# Patient Record
Sex: MALE | Race: WHITE | Employment: FULL TIME | URBAN - METROPOLITAN AREA
[De-identification: names, ages, dates, MRNs, and addresses within clinical notes are randomized per-mention and may not be internally consistent; named-entity substitution may affect disease eponyms.]

---

## 2018-05-21 ENCOUNTER — OFFICE VISIT (OUTPATIENT)
Dept: GASTROENTEROLOGY | Facility: MEDICAL CENTER | Age: 43
End: 2018-05-21
Payer: COMMERCIAL

## 2018-05-21 VITALS
SYSTOLIC BLOOD PRESSURE: 118 MMHG | HEART RATE: 66 BPM | WEIGHT: 182 LBS | BODY MASS INDEX: 27.67 KG/M2 | TEMPERATURE: 97.5 F | DIASTOLIC BLOOD PRESSURE: 82 MMHG

## 2018-05-21 DIAGNOSIS — R10.13 EPIGASTRIC ABDOMINAL PAIN: Primary | ICD-10-CM

## 2018-05-21 DIAGNOSIS — R11.0 NAUSEA: ICD-10-CM

## 2018-05-21 PROCEDURE — 99243 OFF/OP CNSLTJ NEW/EST LOW 30: CPT | Performed by: INTERNAL MEDICINE

## 2018-05-21 RX ORDER — SUCRALFATE 1 G/10ML
SUSPENSION ORAL
COMMUNITY
Start: 2018-04-20

## 2018-05-21 RX ORDER — ONDANSETRON 4 MG/1
TABLET, ORALLY DISINTEGRATING ORAL
COMMUNITY
Start: 2018-04-25

## 2018-05-21 NOTE — PATIENT INSTRUCTIONS
EGD scheduled on 6/5/2018 with Dr Danny Lepe at [facility]  Instructions giving to patient

## 2018-05-21 NOTE — PROGRESS NOTES
[facility] - Outpatient Consultation  Nina Traore 44 y o  male MRN: [id number]  Encounter: [id number]          ASSESSMENT AND PLAN:      1  Epigastric abdominal pain  - labs were done in April, at that time his acute hepatitis panel was negative, liver enzymes, CBC, amylase and lipase were all within normal limits  - recent CT scan without contrast was unremarkable  - his symptoms were likely due to an infection or a stomach ulcer  I will schedule an EGD to rule out esophagitis, gastritis, and PUD  At the time of the procedure, I will take biopsies of any abnormalities I find  I discussed with him the risks and benefits of the procedure  - I will also order an ultrasound of the abdomen, and labs to check lipase and CMP  2  Nausea   - continue taking Carafate daily and ondansetron as needed   - will look for possible causes at the time of EGD  He will follow up based on the results of the studies  If symptoms do not improve, we will order a CT scan with contrast to investigate further etiology  ______________________________________________________________________    HPI:      Nina Traore is a 44 y o  male here for EGD evaluation  He presents with epigastric pain with associated nausea, vomiting, and fatigue  Symptoms started 3 weeks ago, he never had any similar symptoms before  The pain and nausea occur after every meals  He was prescribed Carafate and ondansetron, and the medications have resolved his symptoms  He denies NSAID use  He takes Tylenol for the pain  Symptoms are not affected by positional change  He denies change in stool caliber, melena, hematochezia, rectal bleeding, tenesmus, change in appetite, dysphagia, odynophagia and unintentional weight loss  He denies alcohol and tobacco abuse  REVIEW OF SYSTEMS:    CONSTITUTIONAL: Denies any fever, chills, rigors, and weight loss  HEENT: No earache or tinnitus   Denies hearing loss or visual disturbances  CARDIOVASCULAR: No chest pain or palpitations  RESPIRATORY: Denies any cough, hemoptysis, shortness of breath or dyspnea on exertion  GASTROINTESTINAL: As noted in the History of Present Illness  GENITOURINARY: No problems with urination  Denies any hematuria or dysuria  NEUROLOGIC: No dizziness or vertigo, denies headaches  MUSCULOSKELETAL: Denies any muscle or joint pain  SKIN: Denies skin rashes or itching  ENDOCRINE: Denies excessive thirst  Denies intolerance to heat or cold  PSYCHOSOCIAL: Denies depression or anxiety  Denies any recent memory loss  Historical Information   No past medical history on file  No past surgical history on file  Social History   History   Alcohol use Not on file     History   Drug use: Unknown     History   Smoking Status    Not on file   Smokeless Tobacco    Not on file     No family history on file  Meds/Allergies       Current Outpatient Prescriptions:     acetaminophen (TYLENOL) 100 mg/mL solution    CARAFATE 1 GM/10ML suspension    ondansetron (ZOFRAN-ODT) 4 mg disintegrating tablet    No Known Allergies        Objective     Blood pressure 118/82, pulse 66, temperature 97 5 °F (36 4 °C), temperature source Tympanic, weight 82 6 kg (182 lb)  Body mass index is 27 67 kg/m²  PHYSICAL EXAM:      General Appearance:   Alert, cooperative, no distress   HEENT:   Normocephalic, atraumatic, anicteric      Neck:  Supple, symmetrical, trachea midline   Lungs:   Clear to auscultation bilaterally; no rales, rhonchi or wheezing; respirations unlabored    Heart[de-identified]   Regular rate and rhythm; no murmur, rub, or gallop     Abdomen:   Soft, non-tender, non-distended; normal bowel sounds; no masses, no organomegaly    Genitalia:   Deferred    Rectal:   Deferred    Extremities:  No cyanosis, clubbing or edema    Pulses:  2+ and symmetric    Skin:  No jaundice, rashes, or lesions    Lymph nodes:  No palpable cervical lymphadenopathy        Lab Results: No visits with results within 1 Day(s) from this visit     Latest known visit with results is:   Conversion Encounter Outpatient on 04/24/2015   Component Date Value    Microbiology 04/24/2015 Urine Clean Cat     WBC 04/25/2015 9 88     RBC 04/25/2015 4 68     Hemoglobin 04/25/2015 14 2     Hematocrit 04/25/2015 41 5     MCV 04/25/2015 89     MCH 04/25/2015 30 3     MCHC 04/25/2015 34 2     Platelets 89/15/9227 227     Neutrophils Relative 04/25/2015 70     Neutrophils Absolute 04/25/2015 6 92     RDW 04/25/2015 12 5     MPV 04/25/2015 10 5     nRBC 04/25/2015 0     Lymphocytes Relative 04/25/2015 20     Monocytes Relative 04/25/2015 7     Eosinophils Relative 04/25/2015 3     Lymphocytes Absolute 04/25/2015 1 98     Monocytes Absolute 04/25/2015 0 69     Eosinophils Absolute 04/25/2015 0 30     Basophils Absolute 04/25/2015 0 01     Sodium 04/25/2015 140     Potassium 04/25/2015 3 9     Chloride 04/25/2015 108     CO2 04/25/2015 26     Anion Gap 04/25/2015 6     Glucose 04/25/2015 99     BUN 04/25/2015 15     Calcium 04/25/2015 8 3     Creatinine 04/25/2015 1 07     AAGFR 04/25/2015 >60     NAAGFR 04/25/2015 >60     Creatinine, Ur 04/24/2015 94 9     Sodium Urine Random 04/24/2015 60     Color, UA 04/24/2015 Yellow     Clarity, UA 04/24/2015 Clear     Glucose, UA 04/24/2015 Negative     Bilirubin, UA 04/24/2015 Negative     Ketones, UA 04/24/2015 Negative     Specific Gravity, UA 04/24/2015 1 015     Blood, UA 04/24/2015 Negative     pH, UA 04/24/2015 5 5     Protein, UA 04/24/2015 Negative     Urobilinogen, UA 04/24/2015 0 2     Nitrite, UA 04/24/2015 Negative     Leukocytes, UA 04/24/2015 Negative     Troponin I 04/24/2015 <0 04     Troponin I 04/24/2015 <0 04     Potassium 04/24/2015 3 9     AST 04/24/2015 14     Sodium 04/24/2015 134*    Potassium 04/24/2015 Unable to steven     Chloride 04/24/2015 98*    CO2 04/24/2015 29     Anion Gap 04/24/2015 7     Glucose 04/24/2015 110     BUN 04/24/2015 16     Calcium 04/24/2015 9 2     Creatinine 04/24/2015 1 94*    Total Bilirubin 04/24/2015 0 7     Total Protein 04/24/2015 7 4     Alkaline Phosphatase 04/24/2015 77     ALT 04/24/2015 29     AST 04/24/2015 Unable to steven     Albumin 04/24/2015 3 9     AAGFR 04/24/2015 47     NAAGFR 04/24/2015 39     Magnesium 04/24/2015 2 1     Protime 04/24/2015 13 9     INR 04/24/2015 1 05     PTT 04/24/2015 27     WBC 04/24/2015 13 39*    RBC 04/24/2015 5 29     Hemoglobin 04/24/2015 16 3     Hematocrit 04/24/2015 46 3     MCV 04/24/2015 88     MCH 04/24/2015 30 8     MCHC 04/24/2015 35 2     Platelets 45/04/2409 256     Neutrophils Relative 04/24/2015 76*    Neutrophils Absolute 04/24/2015 10 18*    RDW 04/24/2015 12 4     MPV 04/24/2015 10 1     nRBC 04/24/2015 0     Lymphocytes Relative 04/24/2015 12*    Monocytes Relative 04/24/2015 10     Eosinophils Relative 04/24/2015 2     Lymphocytes Absolute 04/24/2015 1 61     Monocytes Absolute 04/24/2015 1 34*    Eosinophils Absolute 04/24/2015 0 27     Basophils Absolute 04/24/2015 0 02     POC Troponin I 04/24/2015 0 00     GLUCOSE, GLUCOMETER 04/24/2015 109     Ventricular Rate 04/25/2015 72     Atrial Rate 04/25/2015 72     PA Interval 04/25/2015 162     QRSD Interval 04/25/2015 92     QT Interval 04/25/2015 368     QTC Interval 04/25/2015 402     P Axis 04/25/2015 54     QRS Axis 04/25/2015 53     T Wave Axis 04/25/2015 27     Ventricular Rate 04/24/2015 62     Atrial Rate 04/24/2015 62     PA Interval 04/24/2015 170     QRSD Interval 04/24/2015 94     QT Interval 04/24/2015 404     QTC Interval 04/24/2015 410     P Axis 04/24/2015 43     QRS Axis 04/24/2015 77     T Wave Axis 04/24/2015 44            Attestation:   By signing my name below, Ree Grater, attest that this documentation has been prepared under the direction and in the presence of Chip Richey MD  Electronically Signed: Roman Marie  05/21/2018  Sonny Law, personally performed the services described in this documentation  All medical record entries made by the arielaibe were at my direction and in my presence  I have reviewed the chart and discharge instructions and agree    that the record reflects my personal performance and is accurate and complete  Kamille Jang MD  05/21/2018

## 2018-05-21 NOTE — LETTER
May 21, 2018     [address] [facility]  [address]    Patient: Gabe Hidalgo   YOB: 1975   Date of Visit: 5/21/2018       Dear Dr Leana Louise: Thank you for referring Pako Mclain to me for evaluation  Below are my notes for this consultation  If you have questions, please do not hesitate to call me  I look forward to following your patient along with you  Sincerely,        Opal Cooks, MD        CC: No Recipients  Roman Rivera  5/21/2018 10:56 AM  Sign at close encounter  [facility] - Outpatient Consultation  Gabe Hidalgo 44 y o  male MRN: [id number]  Encounter: [id number]          ASSESSMENT AND PLAN:      1  Epigastric abdominal pain  - labs were done in April, at that time his acute hepatitis panel was negative, liver enzymes, CBC, amylase and lipase were all within normal limits  - recent CT scan without contrast was unremarkable  - his symptoms were likely due to an infection or a stomach ulcer  I will schedule an EGD to rule out esophagitis, gastritis, and PUD  At the time of the procedure, I will take biopsies of any abnormalities I find  I discussed with him the risks and benefits of the procedure  - I will also order an ultrasound of the abdomen, and labs to check lipase and CMP  2  Nausea   - continue taking Carafate daily and ondansetron as needed   - will look for possible causes at the time of EGD  He will follow up based on the results of the studies  If symptoms do not improve, we will order a CT scan with contrast to investigate further etiology  ______________________________________________________________________    HPI:      Gabe Hidalgo is a 44 y o  male here for EGD evaluation  He presents with epigastric pain with associated nausea, vomiting, and fatigue  Symptoms started 3 weeks ago, he never had any similar symptoms before  The pain and nausea occur after every meals   He was prescribed Carafate and ondansetron, and the medications have resolved his symptoms  He denies NSAID use  He takes Tylenol for the pain  Symptoms are not affected by positional change  He denies change in stool caliber, melena, hematochezia, rectal bleeding, tenesmus, change in appetite, dysphagia, odynophagia and unintentional weight loss  He denies alcohol and tobacco abuse  REVIEW OF SYSTEMS:    CONSTITUTIONAL: Denies any fever, chills, rigors, and weight loss  HEENT: No earache or tinnitus  Denies hearing loss or visual disturbances  CARDIOVASCULAR: No chest pain or palpitations  RESPIRATORY: Denies any cough, hemoptysis, shortness of breath or dyspnea on exertion  GASTROINTESTINAL: As noted in the History of Present Illness  GENITOURINARY: No problems with urination  Denies any hematuria or dysuria  NEUROLOGIC: No dizziness or vertigo, denies headaches  MUSCULOSKELETAL: Denies any muscle or joint pain  SKIN: Denies skin rashes or itching  ENDOCRINE: Denies excessive thirst  Denies intolerance to heat or cold  PSYCHOSOCIAL: Denies depression or anxiety  Denies any recent memory loss  Historical Information   No past medical history on file  No past surgical history on file  Social History   History   Alcohol use Not on file     History   Drug use: Unknown     History   Smoking Status    Not on file   Smokeless Tobacco    Not on file     No family history on file  Meds/Allergies       Current Outpatient Prescriptions:     acetaminophen (TYLENOL) 100 mg/mL solution    CARAFATE 1 GM/10ML suspension    ondansetron (ZOFRAN-ODT) 4 mg disintegrating tablet    No Known Allergies        Objective     Blood pressure 118/82, pulse 66, temperature 97 5 °F (36 4 °C), temperature source Tympanic, weight 82 6 kg (182 lb)  Body mass index is 27 67 kg/m²          PHYSICAL EXAM:      General Appearance:   Alert, cooperative, no distress   HEENT:   Normocephalic, atraumatic, anicteric      Neck:  Supple, symmetrical, trachea midline   Lungs:   Clear to auscultation bilaterally; no rales, rhonchi or wheezing; respirations unlabored    Heart[de-identified]   Regular rate and rhythm; no murmur, rub, or gallop  Abdomen:   Soft, non-tender, non-distended; normal bowel sounds; no masses, no organomegaly    Genitalia:   Deferred    Rectal:   Deferred    Extremities:  No cyanosis, clubbing or edema    Pulses:  2+ and symmetric    Skin:  No jaundice, rashes, or lesions    Lymph nodes:  No palpable cervical lymphadenopathy        Lab Results:   No visits with results within 1 Day(s) from this visit     Latest known visit with results is:   Conversion Encounter Outpatient on 04/24/2015   Component Date Value    Microbiology 04/24/2015 Urine Clean Cat     WBC 04/25/2015 9 88     RBC 04/25/2015 4 68     Hemoglobin 04/25/2015 14 2     Hematocrit 04/25/2015 41 5     MCV 04/25/2015 89     MCH 04/25/2015 30 3     MCHC 04/25/2015 34 2     Platelets 80/17/6042 227     Neutrophils Relative 04/25/2015 70     Neutrophils Absolute 04/25/2015 6 92     RDW 04/25/2015 12 5     MPV 04/25/2015 10 5     nRBC 04/25/2015 0     Lymphocytes Relative 04/25/2015 20     Monocytes Relative 04/25/2015 7     Eosinophils Relative 04/25/2015 3     Lymphocytes Absolute 04/25/2015 1 98     Monocytes Absolute 04/25/2015 0 69     Eosinophils Absolute 04/25/2015 0 30     Basophils Absolute 04/25/2015 0 01     Sodium 04/25/2015 140     Potassium 04/25/2015 3 9     Chloride 04/25/2015 108     CO2 04/25/2015 26     Anion Gap 04/25/2015 6     Glucose 04/25/2015 99     BUN 04/25/2015 15     Calcium 04/25/2015 8 3     Creatinine 04/25/2015 1 07     AAGFR 04/25/2015 >60     NAAGFR 04/25/2015 >60     Creatinine, Ur 04/24/2015 94 9     Sodium Urine Random 04/24/2015 60     Color, UA 04/24/2015 Yellow     Clarity, UA 04/24/2015 Clear     Glucose, UA 04/24/2015 Negative     Bilirubin, UA 04/24/2015 Negative     Ketones, UA 04/24/2015 Negative  Specific Gravity, UA 04/24/2015 1 015     Blood, UA 04/24/2015 Negative     pH, UA 04/24/2015 5 5     Protein, UA 04/24/2015 Negative     Urobilinogen, UA 04/24/2015 0 2     Nitrite, UA 04/24/2015 Negative     Leukocytes, UA 04/24/2015 Negative     Troponin I 04/24/2015 <0 04     Troponin I 04/24/2015 <0 04     Potassium 04/24/2015 3 9     AST 04/24/2015 14     Sodium 04/24/2015 134*    Potassium 04/24/2015 Unable to steven     Chloride 04/24/2015 98*    CO2 04/24/2015 29     Anion Gap 04/24/2015 7     Glucose 04/24/2015 110     BUN 04/24/2015 16     Calcium 04/24/2015 9 2     Creatinine 04/24/2015 1 94*    Total Bilirubin 04/24/2015 0 7     Total Protein 04/24/2015 7 4     Alkaline Phosphatase 04/24/2015 77     ALT 04/24/2015 29     AST 04/24/2015 Unable to steven     Albumin 04/24/2015 3 9     AAGFR 04/24/2015 47     NAAGFR 04/24/2015 39     Magnesium 04/24/2015 2 1     Protime 04/24/2015 13 9     INR 04/24/2015 1 05     PTT 04/24/2015 27     WBC 04/24/2015 13 39*    RBC 04/24/2015 5 29     Hemoglobin 04/24/2015 16 3     Hematocrit 04/24/2015 46 3     MCV 04/24/2015 88     MCH 04/24/2015 30 8     MCHC 04/24/2015 35 2     Platelets 71/88/1540 256     Neutrophils Relative 04/24/2015 76*    Neutrophils Absolute 04/24/2015 10 18*    RDW 04/24/2015 12 4     MPV 04/24/2015 10 1     nRBC 04/24/2015 0     Lymphocytes Relative 04/24/2015 12*    Monocytes Relative 04/24/2015 10     Eosinophils Relative 04/24/2015 2     Lymphocytes Absolute 04/24/2015 1 61     Monocytes Absolute 04/24/2015 1 34*    Eosinophils Absolute 04/24/2015 0 27     Basophils Absolute 04/24/2015 0 02     POC Troponin I 04/24/2015 0 00     GLUCOSE, GLUCOMETER 04/24/2015 109     Ventricular Rate 04/25/2015 72     Atrial Rate 04/25/2015 72     OR Interval 04/25/2015 162     QRSD Interval 04/25/2015 92     QT Interval 04/25/2015 368     QTC Interval 04/25/2015 402     P Axis 04/25/2015 54     QRS Axis 04/25/2015 53     T Wave Axis 04/25/2015 27     Ventricular Rate 04/24/2015 62     Atrial Rate 04/24/2015 62     IA Interval 04/24/2015 170     QRSD Interval 04/24/2015 94     QT Interval 04/24/2015 404     QTC Interval 04/24/2015 410     P Axis 04/24/2015 43     QRS Axis 04/24/2015 77     T Wave Axis 04/24/2015 44            Attestation:   By signing my name below, Geovanny Philip, attest that this documentation has been prepared under the direction and in the presence of Ludwin Silverio MD  Electronically Signed: Roman Denise  05/21/2018  Malcolm Ott, personally performed the services described in this documentation  All medical record entries made by the arielaibdelia were at my direction and in my presence  I have reviewed the chart and discharge instructions and agree    that the record reflects my personal performance and is accurate and complete  Ludwin Silverio MD  05/21/2018

## 2018-06-05 ENCOUNTER — TELEPHONE (OUTPATIENT)
Dept: GASTROENTEROLOGY | Facility: MEDICAL CENTER | Age: 43
End: 2018-06-05

## 2019-06-01 ENCOUNTER — HOSPITAL ENCOUNTER (EMERGENCY)
Facility: HOSPITAL | Age: 44
Discharge: HOME | End: 2019-06-01
Attending: EMERGENCY MEDICINE | Admitting: EMERGENCY MEDICINE
Payer: COMMERCIAL

## 2019-06-01 VITALS
TEMPERATURE: 98.9 F | HEART RATE: 69 BPM | DIASTOLIC BLOOD PRESSURE: 85 MMHG | OXYGEN SATURATION: 99 % | SYSTOLIC BLOOD PRESSURE: 167 MMHG | RESPIRATION RATE: 18 BRPM

## 2019-06-01 DIAGNOSIS — M54.9 BACK PAIN: Primary | ICD-10-CM

## 2019-06-01 PROCEDURE — 99283 EMERGENCY DEPT VISIT LOW MDM: CPT

## 2019-06-01 PROCEDURE — 99282 EMERGENCY DEPT VISIT SF MDM: CPT | Performed by: PHYSICIAN ASSISTANT

## 2019-06-01 RX ORDER — DIAZEPAM 5 MG/1
5 TABLET ORAL ONCE
Status: COMPLETED | OUTPATIENT
Start: 2019-06-01 | End: 2019-06-01

## 2019-06-01 RX ORDER — PREDNISONE 20 MG/1
60 TABLET ORAL ONCE
Status: COMPLETED | OUTPATIENT
Start: 2019-06-01 | End: 2019-06-01

## 2019-06-01 RX ORDER — PREDNISONE 20 MG/1
20 TABLET ORAL 2 TIMES DAILY WITH MEALS
Qty: 10 TABLET | Refills: 0 | Status: SHIPPED | OUTPATIENT
Start: 2019-06-01 | End: 2019-06-06

## 2019-06-01 RX ADMIN — DIAZEPAM 5 MG: 5 TABLET ORAL at 21:04

## 2019-06-01 RX ADMIN — PREDNISONE 60 MG: 20 TABLET ORAL at 21:04

## 2020-02-07 ENCOUNTER — HOSPITAL ENCOUNTER (EMERGENCY)
Facility: HOSPITAL | Age: 45
Discharge: HOME | End: 2020-02-07
Attending: EMERGENCY MEDICINE | Admitting: EMERGENCY MEDICINE
Payer: COMMERCIAL

## 2020-02-07 VITALS
RESPIRATION RATE: 16 BRPM | SYSTOLIC BLOOD PRESSURE: 165 MMHG | TEMPERATURE: 98.5 F | DIASTOLIC BLOOD PRESSURE: 100 MMHG | OXYGEN SATURATION: 99 % | BODY MASS INDEX: 25.54 KG/M2 | WEIGHT: 168 LBS | HEART RATE: 90 BPM

## 2020-02-07 DIAGNOSIS — N48.1 BALANITIS: Primary | ICD-10-CM

## 2020-02-07 DIAGNOSIS — Z72.51 UNPROTECTED SEX: ICD-10-CM

## 2020-02-07 LAB
BACTERIA UR QL AUTO: ABNORMAL /HPF
BILIRUB UR QL STRIP: NEGATIVE
CLARITY UR: CLEAR
COLOR UR: YELLOW
GLUCOSE UR STRIP-MCNC: NEGATIVE MG/DL
HGB UR QL STRIP.AUTO: ABNORMAL
KETONES UR STRIP-MCNC: NEGATIVE MG/DL
LEUKOCYTE ESTERASE UR QL STRIP: NEGATIVE
NITRITE UR QL STRIP: NEGATIVE
NON-SQ EPI CELLS URNS QL MICRO: ABNORMAL /HPF
PH UR STRIP.AUTO: 6.5 [PH] (ref 4.5–8)
PROT UR STRIP-MCNC: NEGATIVE MG/DL
RBC #/AREA URNS AUTO: ABNORMAL /HPF
SP GR UR STRIP.AUTO: 1.02 (ref 1–1.03)
UROBILINOGEN UR QL STRIP.AUTO: 1 E.U./DL
WBC #/AREA URNS AUTO: ABNORMAL /HPF

## 2020-02-07 PROCEDURE — 81001 URINALYSIS AUTO W/SCOPE: CPT

## 2020-02-07 PROCEDURE — 99284 EMERGENCY DEPT VISIT MOD MDM: CPT | Performed by: EMERGENCY MEDICINE

## 2020-02-07 PROCEDURE — 99283 EMERGENCY DEPT VISIT LOW MDM: CPT

## 2020-02-07 PROCEDURE — 96372 THER/PROPH/DIAG INJ SC/IM: CPT

## 2020-02-07 PROCEDURE — 87491 CHLMYD TRACH DNA AMP PROBE: CPT | Performed by: PHYSICIAN ASSISTANT

## 2020-02-07 PROCEDURE — 87591 N.GONORRHOEAE DNA AMP PROB: CPT | Performed by: PHYSICIAN ASSISTANT

## 2020-02-07 RX ORDER — AZITHROMYCIN 250 MG/1
1000 TABLET, FILM COATED ORAL ONCE
Status: COMPLETED | OUTPATIENT
Start: 2020-02-07 | End: 2020-02-07

## 2020-02-07 RX ORDER — CLOTRIMAZOLE 1 %
CREAM (GRAM) TOPICAL 2 TIMES DAILY
Qty: 30 G | Refills: 0 | Status: SHIPPED | OUTPATIENT
Start: 2020-02-07

## 2020-02-07 RX ADMIN — LIDOCAINE HYDROCHLORIDE 250 MG: 10 INJECTION, SOLUTION EPIDURAL; INFILTRATION; INTRACAUDAL; PERINEURAL at 18:09

## 2020-02-07 RX ADMIN — AZITHROMYCIN 1000 MG: 250 TABLET, FILM COATED ORAL at 18:05

## 2020-02-07 NOTE — ED PROVIDER NOTES
History  Chief Complaint   Patient presents with    Penile Discharge     Reports had unprotected sex yesterday and today is having clear discharge with pain  Denies pain in testicles  Reports rash  Patient is a 46-year-old male with no significant past medical history presents for evaluation of penile irritation and discharge  He states that yesterday he had unprotected sex with a new female partner  He states that it had been a while since he had sexual relations  He states that today he woke up and there was some redness, burning, irritation to the glans of the penis  He does know he is uncircumcised  He states that when he retracted the foreskin there was some moist drainage around the glans  He has not seen any drainage from the urethral meatus  He denies any dysuria, hematuria, frequency  He denies any testicular pain or swelling  Has no history of same  He has no history of STDs  He denies fever, chills, nausea vomiting diarrhea, chest pain, shortness breath, abdominal pain  Prior to Admission Medications   Prescriptions Last Dose Informant Patient Reported? Taking? CARAFATE 1 GM/10ML suspension   Yes No   acetaminophen (TYLENOL) 100 mg/mL solution   Yes No   Sig: Take 10 mg/kg by mouth every 4 (four) hours as needed for fever   ondansetron (ZOFRAN-ODT) 4 mg disintegrating tablet   Yes No      Facility-Administered Medications: None       History reviewed  No pertinent past medical history  Past Surgical History:   Procedure Laterality Date    HERNIA REPAIR         History reviewed  No pertinent family history  I have reviewed and agree with the history as documented  Social History     Tobacco Use    Smoking status: Never Smoker    Smokeless tobacco: Never Used   Substance Use Topics    Alcohol use: Not Currently    Drug use: Not Currently        Review of Systems   Constitutional: Negative for chills and fever  Respiratory: Negative for shortness of breath  Cardiovascular: Negative for chest pain  Gastrointestinal: Negative for abdominal pain, diarrhea, nausea and vomiting  Genitourinary: Negative for discharge, dysuria, penile swelling, scrotal swelling and testicular pain  Redness/irritation to glans of penis, moist drainage around glans   All other systems reviewed and are negative  Physical Exam  Physical Exam   Constitutional: Vital signs are normal  He appears well-developed and well-nourished  He is active  Non-toxic appearance  No distress  HENT:   Head: Normocephalic and atraumatic  Right Ear: External ear normal    Left Ear: External ear normal    Nose: Nose normal    Eyes: EOM are normal    Neck: Normal range of motion  Cardiovascular: Normal rate, regular rhythm and normal heart sounds  Exam reveals no gallop and no friction rub  No murmur heard  Pulmonary/Chest: Effort normal and breath sounds normal  No stridor  No respiratory distress  He has no wheezes  Genitourinary: Testes normal  Uncircumcised  Penile erythema present  No phimosis or paraphimosis  Discharge found  Genitourinary Comments:  exam performed in the presence of female nurse chaperone  Penis is uncircumcised  Foreskin easily retracted  The glans of the penis is erythematous and there is a moist discharge noted  Mild irritation noted  No vesicles  No drainage from urethral meatus  No scrotal swelling  No rash elsewhere  Neurological: He is alert  Skin: Skin is warm and dry  He is not diaphoretic  Psychiatric: He has a normal mood and affect  His behavior is normal    Nursing note and vitals reviewed        Vital Signs  ED Triage Vitals [02/07/20 1728]   Temperature Pulse Respirations Blood Pressure SpO2   98 5 °F (36 9 °C) 90 16 165/100 99 %      Temp Source Heart Rate Source Patient Position - Orthostatic VS BP Location FiO2 (%)   Temporal Monitor Sitting Right arm --      Pain Score       No Pain           Vitals:    02/07/20 1728   BP: 165/100 Pulse: 90   Patient Position - Orthostatic VS: Sitting         Visual Acuity      ED Medications  Medications   cefTRIAXone (ROCEPHIN) 250 mg in lidocaine (PF) (XYLOCAINE-MPF) 1 % IM only syringe (250 mg Intramuscular Given 2/7/20 1809)   azithromycin (ZITHROMAX) tablet 1,000 mg (1,000 mg Oral Given 2/7/20 1805)       Diagnostic Studies  Results Reviewed     Procedure Component Value Units Date/Time    Urine Microscopic [[id number]]  (Abnormal) Collected:  02/07/20 1753    Lab Status:  Final result Specimen:  Urine, Clean Catch Updated:  02/07/20 1838     RBC, UA 4-10 /hpf      WBC, UA None Seen /hpf      Epithelial Cells Occasional /hpf      Bacteria, UA Occasional /hpf     Chlamydia/GC amplified DNA by PCR [[id number]] Collected:  02/07/20 1756    Lab Status: In process Specimen:  Urine, Other Updated:  02/07/20 1812    POCT urinalysis dipstick [[id number]]  (Abnormal) Resulted:  02/07/20 1756    Lab Status:  Final result Updated:  02/07/20 1756    Urine Macroscopic, POC [[id number]]  (Abnormal) Collected:  02/07/20 1753    Lab Status:  Final result Specimen:  Urine Updated:  02/07/20 1754     Color, UA Yellow     Clarity, UA Clear     pH, UA 6 5     Leukocytes, UA Negative     Nitrite, UA Negative     Protein, UA Negative mg/dl      Glucose, UA Negative mg/dl      Ketones, UA Negative mg/dl      Urobilinogen, UA 1 0 E U /dl      Bilirubin, UA Negative     Blood, UA Small     Specific [place], UA 1 020    Narrative:       CLINITEK RESULT                 No orders to display              Procedures  Procedures         ED Course                               MDM  Number of Diagnoses or Management Options  Balanitis:   Unprotected sex:   Diagnosis management comments: Discussed balanitis with patient  Will cover with clotrimazole  Discussed importance of proper cleaning around glans, especially after intercourse  Patient concerned for STDs  Explained that GC/Chlamydia will not be back for 3-4 days   He will be called with positive results  Patient would like treatment here  Will give rocephin and azithromycin here  Advised to refrain from sexual intercourse for 2 weeks  Follow up with clinic for further std testing if needed  Return to the ED if symptoms worsen or new symptoms arise as discussed  Patient states understanding and agrees with plan  Amount and/or Complexity of Data Reviewed  Clinical lab tests: ordered and reviewed          Disposition  Final diagnoses:   Balanitis   Unprotected sex     Time reflects when diagnosis was documented in both MDM as applicable and the Disposition within this note     Time User Action Codes Description Comment    2/7/2020  6:00 PM Deandre Tolliver Add [N48 1] Balanitis     2/7/2020  6:00 PM Deandre Tolliver Add [Z72 51] Unprotected sex       ED Disposition     ED Disposition Condition Date/Time Comment    Discharge Stable Fri Feb 7, 2020  6:01 PM Elsie Zabala discharge to home/self care              Follow-up Information     Follow up With Specialties Details Why Contact Info Additional [address] [facility] ,  Family Medicine Schedule an appointment as soon as possible for a visit in 1 day  [address] [place] [address] Emergency Department Emergency Medicine  If symptoms worsen [facility] [address]  [place] [address] ED, [address] [place], [place], [address]        Follow up with STD/HIV clinic for further testing if needed           Discharge Medication List as of 2/7/2020  6:03 PM      START taking these medications    Details   clotrimazole (LOTRIMIN) 1 % cream Apply topically 2 (two) times a day, Starting Fri 2/7/2020, Normal         CONTINUE these medications which have NOT CHANGED    Details   acetaminophen (TYLENOL) 100 mg/mL solution Take 10 mg/kg by mouth every 4 (four) hours as needed for fever, Historical Med      CARAFATE 1 GM/10ML suspension Starting Fri 4/20/2018, Historical Med      ondansetron (ZOFRAN-ODT) 4 mg disintegrating tablet Starting Wed 4/25/2018, Historical Med           No discharge procedures on file      ED Provider  Electronically Signed by           Samy Lay PA-C  02/07/20 0964

## 2020-02-08 LAB
C TRACH DNA SPEC QL NAA+PROBE: NEGATIVE
N GONORRHOEA DNA SPEC QL NAA+PROBE: NEGATIVE

## 2023-08-26 ENCOUNTER — HOSPITAL ENCOUNTER (EMERGENCY)
Facility: HOSPITAL | Age: 48
Discharge: HOME | End: 2023-08-26
Attending: EMERGENCY MEDICINE | Admitting: EMERGENCY MEDICINE

## 2023-08-26 VITALS
HEART RATE: 86 BPM | RESPIRATION RATE: 16 BRPM | DIASTOLIC BLOOD PRESSURE: 110 MMHG | WEIGHT: 175.71 LBS | BODY MASS INDEX: 26.72 KG/M2 | TEMPERATURE: 98.4 F | SYSTOLIC BLOOD PRESSURE: 188 MMHG | OXYGEN SATURATION: 100 %

## 2023-08-26 DIAGNOSIS — R51.9 HEADACHE: ICD-10-CM

## 2023-08-26 DIAGNOSIS — I10 HIGH BLOOD PRESSURE: Primary | ICD-10-CM

## 2023-08-26 LAB
ALBUMIN SERPL BCP-MCNC: 4.8 G/DL (ref 3.5–5)
ALP SERPL-CCNC: 64 U/L (ref 34–104)
ALT SERPL W P-5'-P-CCNC: 23 U/L (ref 7–52)
ANION GAP SERPL CALCULATED.3IONS-SCNC: 7 MMOL/L
AST SERPL W P-5'-P-CCNC: 18 U/L (ref 13–39)
BASOPHILS # BLD AUTO: 0.04 THOUSANDS/ÂΜL (ref 0–0.1)
BASOPHILS NFR BLD AUTO: 1 % (ref 0–1)
BILIRUB DIRECT SERPL-MCNC: 0.24 MG/DL (ref 0–0.2)
BILIRUB SERPL-MCNC: 1.36 MG/DL (ref 0.2–1)
BUN SERPL-MCNC: 12 MG/DL (ref 5–25)
CALCIUM SERPL-MCNC: 9.8 MG/DL (ref 8.4–10.2)
CHLORIDE SERPL-SCNC: 103 MMOL/L (ref 96–108)
CO2 SERPL-SCNC: 28 MMOL/L (ref 21–32)
CREAT SERPL-MCNC: 0.97 MG/DL (ref 0.6–1.3)
EOSINOPHIL # BLD AUTO: 0.19 THOUSAND/ÂΜL (ref 0–0.61)
EOSINOPHIL NFR BLD AUTO: 4 % (ref 0–6)
ERYTHROCYTE [DISTWIDTH] IN BLOOD BY AUTOMATED COUNT: 12.5 % (ref 11.6–15.1)
GFR SERPL CREATININE-BSD FRML MDRD: 92 ML/MIN/1.73SQ M
GLUCOSE SERPL-MCNC: 91 MG/DL (ref 65–140)
HCT VFR BLD AUTO: 48.7 % (ref 36.5–49.3)
HGB BLD-MCNC: 15.9 G/DL (ref 12–17)
IMM GRANULOCYTES # BLD AUTO: 0.01 THOUSAND/UL (ref 0–0.2)
IMM GRANULOCYTES NFR BLD AUTO: 0 % (ref 0–2)
LYMPHOCYTES # BLD AUTO: 1.68 THOUSANDS/ÂΜL (ref 0.6–4.47)
LYMPHOCYTES NFR BLD AUTO: 31 % (ref 14–44)
MCH RBC QN AUTO: 30.6 PG (ref 26.8–34.3)
MCHC RBC AUTO-ENTMCNC: 32.6 G/DL (ref 31.4–37.4)
MCV RBC AUTO: 94 FL (ref 82–98)
MONOCYTES # BLD AUTO: 0.56 THOUSAND/ÂΜL (ref 0.17–1.22)
MONOCYTES NFR BLD AUTO: 10 % (ref 4–12)
NEUTROPHILS # BLD AUTO: 2.94 THOUSANDS/ÂΜL (ref 1.85–7.62)
NEUTS SEG NFR BLD AUTO: 54 % (ref 43–75)
NRBC BLD AUTO-RTO: 0 /100 WBCS
PLATELET # BLD AUTO: 214 THOUSANDS/UL (ref 149–390)
PMV BLD AUTO: 9.7 FL (ref 8.9–12.7)
POTASSIUM SERPL-SCNC: 4.6 MMOL/L (ref 3.5–5.3)
PROT SERPL-MCNC: 7 G/DL (ref 6.4–8.4)
RBC # BLD AUTO: 5.19 MILLION/UL (ref 3.88–5.62)
SODIUM SERPL-SCNC: 138 MMOL/L (ref 135–147)
TSH SERPL DL<=0.05 MIU/L-ACNC: 0.86 UIU/ML (ref 0.45–4.5)
WBC # BLD AUTO: 5.42 THOUSAND/UL (ref 4.31–10.16)

## 2023-08-26 PROCEDURE — 80048 BASIC METABOLIC PNL TOTAL CA: CPT | Performed by: EMERGENCY MEDICINE

## 2023-08-26 PROCEDURE — 93005 ELECTROCARDIOGRAM TRACING: CPT

## 2023-08-26 PROCEDURE — 99285 EMERGENCY DEPT VISIT HI MDM: CPT | Performed by: EMERGENCY MEDICINE

## 2023-08-26 PROCEDURE — 80076 HEPATIC FUNCTION PANEL: CPT | Performed by: EMERGENCY MEDICINE

## 2023-08-26 PROCEDURE — 96374 THER/PROPH/DIAG INJ IV PUSH: CPT

## 2023-08-26 PROCEDURE — 36415 COLL VENOUS BLD VENIPUNCTURE: CPT | Performed by: EMERGENCY MEDICINE

## 2023-08-26 PROCEDURE — 96361 HYDRATE IV INFUSION ADD-ON: CPT

## 2023-08-26 PROCEDURE — 99283 EMERGENCY DEPT VISIT LOW MDM: CPT

## 2023-08-26 PROCEDURE — 85025 COMPLETE CBC W/AUTO DIFF WBC: CPT | Performed by: EMERGENCY MEDICINE

## 2023-08-26 PROCEDURE — 84443 ASSAY THYROID STIM HORMONE: CPT | Performed by: EMERGENCY MEDICINE

## 2023-08-26 RX ORDER — KETOROLAC TROMETHAMINE 30 MG/ML
15 INJECTION, SOLUTION INTRAMUSCULAR; INTRAVENOUS ONCE
Status: COMPLETED | OUTPATIENT
Start: 2023-08-26 | End: 2023-08-26

## 2023-08-26 RX ORDER — NAPROXEN 500 MG/1
500 TABLET ORAL 2 TIMES DAILY WITH MEALS
Qty: 20 TABLET | Refills: 0 | Status: SHIPPED | OUTPATIENT
Start: 2023-08-26 | End: 2023-09-05

## 2023-08-26 RX ADMIN — SODIUM CHLORIDE 1000 ML: 0.9 INJECTION, SOLUTION INTRAVENOUS at 09:40

## 2023-08-26 RX ADMIN — KETOROLAC TROMETHAMINE 15 MG: 30 INJECTION, SOLUTION INTRAMUSCULAR; INTRAVENOUS at 09:40

## 2023-08-26 NOTE — ED PROVIDER NOTES
History  Chief Complaint   Patient presents with   • High Blood Pressure     Patient checked his B/P at [facility] yesterday and it was high so he wants to get it checked out. 52 YO male presents for evlauation of elevated blood pressure. Patient states he has had some pressure in the occipital head since this morning. He was at a pharmacy so checked his blood pressure and found it to be elevated. Patient states he does have a history of elevated blood pressure, had been taking medications some time ago but this was making him dizzy so he stopped taking it. Patient denies any chest pain, no dizziness. Pt denies CP/SOB/F/C/N/V/D/C, no dysuria, burning on urination or blood in urine. History provided by:  Patient   used: No        Prior to Admission Medications   Prescriptions Last Dose Informant Patient Reported? Taking? CARAFATE 1 GM/10ML suspension   Yes No   acetaminophen (TYLENOL) 100 mg/mL solution   Yes No   Sig: Take 10 mg/kg by mouth every 4 (four) hours as needed for fever   clotrimazole (LOTRIMIN) 1 % cream   No No   Sig: Apply topically 2 (two) times a day   ondansetron (ZOFRAN-ODT) 4 mg disintegrating tablet   Yes No      Facility-Administered Medications: None       No past medical history on file. Past Surgical History:   Procedure Laterality Date   • HERNIA REPAIR         No family history on file. I have reviewed and agree with the history as documented. E-Cigarette/Vaping     E-Cigarette/Vaping Substances     Social History     Tobacco Use   • Smoking status: Never   • Smokeless tobacco: Never   Substance Use Topics   • Alcohol use: Not Currently   • Drug use: Not Currently       Review of Systems   Constitutional: Negative for fever. HENT: Negative for dental problem. Eyes: Negative for visual disturbance. Respiratory: Negative for shortness of breath. Cardiovascular: Negative for chest pain.    Gastrointestinal: Negative for abdominal pain, nausea and vomiting. Genitourinary: Negative for dysuria and frequency. Musculoskeletal: Negative for neck pain and neck stiffness. Skin: Negative for rash. Neurological: Positive for headaches. Negative for dizziness, weakness and light-headedness. Psychiatric/Behavioral: Negative for agitation, behavioral problems and confusion. All other systems reviewed and are negative. Physical Exam  Physical Exam  Vitals and nursing note reviewed. Constitutional:       Appearance: He is well-developed. HENT:      Head: Normocephalic and atraumatic. Eyes:      Extraocular Movements: Extraocular movements intact. Cardiovascular:      Rate and Rhythm: Normal rate and regular rhythm. Pulses: Normal pulses. Heart sounds: Normal heart sounds. Pulmonary:      Effort: Pulmonary effort is normal.      Breath sounds: Normal breath sounds. Abdominal:      General: There is no distension. Musculoskeletal:         General: Normal range of motion. Cervical back: Normal range of motion. Skin:     Findings: No rash. Neurological:      Mental Status: He is alert and oriented to person, place, and time.    Psychiatric:         Behavior: Behavior normal.         Vital Signs  ED Triage Vitals   Temperature Pulse Respirations Blood Pressure SpO2   08/26/23 0917 08/26/23 0917 08/26/23 0917 08/26/23 0918 08/26/23 0917   98.4 °F (36.9 °C) 86 16 (!) 188/110 100 %      Temp Source Heart Rate Source Patient Position - Orthostatic VS BP Location FiO2 (%)   08/26/23 0917 08/26/23 0917 08/26/23 0917 08/26/23 0917 --   Oral Monitor Sitting Right arm       Pain Score       08/26/23 0917       No Pain           Vitals:    08/26/23 0917 08/26/23 0918   BP:  (!) 188/110   Pulse: 86    Patient Position - Orthostatic VS: Sitting          Visual Acuity      ED Medications  Medications   sodium chloride 0.9 % bolus 1,000 mL (0 mL Intravenous Stopped 8/26/23 1058)   ketorolac (TORADOL) injection 15 mg (15 mg Intravenous Given 8/26/23 0940)       Diagnostic Studies  Results Reviewed     Procedure Component Value Units Date/Time    TSH, 3rd generation with Free T4 reflex [[id number]]  (Normal) Collected: 08/26/23 0940    Lab Status: Final result Specimen: Blood from Arm, Left Updated: 08/26/23 1022     TSH 3RD GENERATON 0.858 uIU/mL     Basic metabolic panel [[id number]] Collected: 08/26/23 0940    Lab Status: Final result Specimen: Blood from Arm, Left Updated: 08/26/23 1018     Sodium 138 mmol/L      Potassium 4.6 mmol/L      Chloride 103 mmol/L      CO2 28 mmol/L      ANION GAP 7 mmol/L      BUN 12 mg/dL      Creatinine 0.97 mg/dL      Glucose 91 mg/dL      Calcium 9.8 mg/dL      eGFR 92 ml/min/1.73sq m     Narrative:      [place] guidelines for Chronic Kidney Disease (CKD):   •  Stage 1 with normal or high GFR (GFR > 90 mL/min/1.73 square meters)  •  Stage 2 Mild CKD (GFR = 60-89 mL/min/1.73 square meters)  •  Stage 3A Moderate CKD (GFR = 45-59 mL/min/1.73 square meters)  •  Stage 3B Moderate CKD (GFR = 30-44 mL/min/1.73 square meters)  •  Stage 4 Severe CKD (GFR = 15-29 mL/min/1.73 square meters)  •  Stage 5 End Stage CKD (GFR <15 mL/min/1.73 square meters)  Note: GFR calculation is accurate only with a steady state creatinine    Hepatic function panel [[id number]]  (Abnormal) Collected: 08/26/23 0940    Lab Status: Final result Specimen: Blood from Arm, Left Updated: 08/26/23 1018     Total Bilirubin 1.36 mg/dL      Bilirubin, Direct 0.24 mg/dL      Alkaline Phosphatase 64 U/L      AST 18 U/L      ALT 23 U/L      Total Protein 7.0 g/dL      Albumin 4.8 g/dL     CBC and differential [[id number]] Collected: 08/26/23 0940    Lab Status: Final result Specimen: Blood from Arm, Left Updated: 08/26/23 0949     WBC 5.42 Thousand/uL      RBC 5.19 Million/uL      Hemoglobin 15.9 g/dL      Hematocrit 48.7 %      MCV 94 fL      MCH 30.6 pg      MCHC 32.6 g/dL      RDW 12.5 %      MPV 9.7 fL      Platelets 528 Thousands/uL nRBC 0 /100 WBCs      Neutrophils Relative 54 %      Immat GRANS % 0 %      Lymphocytes Relative 31 %      Monocytes Relative 10 %      Eosinophils Relative 4 %      Basophils Relative 1 %      Neutrophils Absolute 2.94 Thousands/µL      Immature Grans Absolute 0.01 Thousand/uL      Lymphocytes Absolute 1.68 Thousands/µL      Monocytes Absolute 0.56 Thousand/µL      Eosinophils Absolute 0.19 Thousand/µL      Basophils Absolute 0.04 Thousands/µL                  No orders to display              Procedures  ECG 12 Lead Documentation Only    Date/Time: 8/26/2023 9:45 AM    Performed by: Sal Tovar MD  Authorized by: Sal Tovar MD    ECG reviewed by me, the ED Provider: yes    Patient location:  ED  Previous ECG:     Previous ECG:  Compared to current    Comparison ECG info:  4/25/2015    Similarity:  No change  Interpretation:     Interpretation: normal    Rate:     ECG rate:  72    ECG rate assessment: normal    Rhythm:     Rhythm: sinus rhythm    QRS:     QRS axis:  Normal    QRS intervals:  Normal  Conduction:     Conduction: normal    ST segments:     ST segments:  Normal  T waves:     T waves: normal               ED Course  ED Course as of 08/26/23 1232   Sat Aug 26, 2023   0946 ECG evaluated by myself, interpretation included in procedure section of note. 1048 Patient denies headache at this time. He is appropriate for discharge. Did discuss need to follow up with PCP, return for chest pain, worsening headaches. SBIRT 20yo+    Flowsheet Row Most Recent Value   Initial Alcohol Screen: US AUDIT-C     1. How often do you have a drink containing alcohol? 0 Filed at: 08/26/2023 0945   2. How many drinks containing alcohol do you have on a typical day you are drinking? 0 Filed at: 08/26/2023 0945   3a. Male UNDER 65: How often do you have five or more drinks on one occasion?  0 Filed at: 08/26/2023 0945   Audit-C Score 0 Filed at: 08/26/2023 0945   NADEEN: How many times in the past year have you. .. Used an illegal drug or used a prescription medication for non-medical reasons? Never Filed at: 08/26/2023 0945                    Medical Decision Making  1. Elevated blood pressure - Patient with history of elevated pressure, not taking medications due to side effects, now with headache. Pressure does not seem high enough to cause symptoms. Will check ECG, CBC,  LFT's to assess GB dysfunction, lipase for pancreatitis, LFT's for signs of end-organ damage. Will give fluids, NSAIDs for headache, monitor blood pressure. Headache: acute illness or injury  High blood pressure: acute illness or injury  Amount and/or Complexity of Data Reviewed  External Data Reviewed: notes. Labs: ordered. ECG/medicine tests: ordered and independent interpretation performed. Decision-making details documented in ED Course. Risk  Prescription drug management. Disposition  Final diagnoses:   High blood pressure   Headache     Time reflects when diagnosis was documented in both MDM as applicable and the Disposition within this note     Time User Action Codes Description Comment    8/26/2023 10:49 AM Ashubaltazar Medina [I10] High blood pressure     8/26/2023 10:49 AM Ashu Meidna [R51.9] Headache       ED Disposition     ED Disposition   Discharge    Condition   Stable    Date/Time   Sat Aug 26, 2023 10:49 AM    Comment   Forrest James discharge to home/self care.                Follow-up Information     Follow up With Specialties Details Why [address] [facility] DO Family Medicine   [address] [place] [address]  [phone]            Discharge Medication List as of 8/26/2023 10:51 AM      START taking these medications    Details   naproxen (NAPROSYN) 500 mg tablet Take 1 tablet (500 mg total) by mouth 2 (two) times a day with meals for 10 days, Starting Sat 8/26/2023, Until Tue 9/5/2023, Normal         CONTINUE these medications which have NOT CHANGED Details   acetaminophen (TYLENOL) 100 mg/mL solution Take 10 mg/kg by mouth every 4 (four) hours as needed for fever, Historical Med      CARAFATE 1 GM/10ML suspension Starting Fri 4/20/2018, Historical Med      clotrimazole (LOTRIMIN) 1 % cream Apply topically 2 (two) times a day, Starting Fri 2/7/2020, Normal      ondansetron (ZOFRAN-ODT) 4 mg disintegrating tablet Starting Wed 4/25/2018, Historical Med             No discharge procedures on file.     PDMP Review     None          ED Provider  Electronically Signed by           Mack Anna MD  08/26/23 5990

## 2023-08-26 NOTE — DISCHARGE INSTRUCTIONS
Call your family doctor, let them know you were in the ER and should be seen in the office for further evaluation, likely for blood pressure checks to determine if starting a new medication is appropriate. Return to the ER if you develop chest pain, shortness of breath or worsening headaches. Take the Naprosyn twice daily for the next 5-10 days.

## 2023-08-27 LAB
ATRIAL RATE: 72 BPM
P AXIS: 72 DEGREES
PR INTERVAL: 158 MS
QRS AXIS: 62 DEGREES
QRSD INTERVAL: 88 MS
QT INTERVAL: 374 MS
QTC INTERVAL: 409 MS
T WAVE AXIS: 46 DEGREES
VENTRICULAR RATE: 72 BPM

## 2023-08-27 PROCEDURE — 93010 ELECTROCARDIOGRAM REPORT: CPT | Performed by: INTERNAL MEDICINE
